# Patient Record
Sex: FEMALE | Race: WHITE | NOT HISPANIC OR LATINO | RURAL
[De-identification: names, ages, dates, MRNs, and addresses within clinical notes are randomized per-mention and may not be internally consistent; named-entity substitution may affect disease eponyms.]

---

## 2021-03-12 ENCOUNTER — PREPPED CHART (OUTPATIENT)
Dept: RURAL CLINIC 1 | Facility: CLINIC | Age: 61
End: 2021-03-12

## 2021-03-12 ENCOUNTER — IMPORTED ENCOUNTER (OUTPATIENT)
Dept: URBAN - NONMETROPOLITAN AREA CLINIC 1 | Facility: CLINIC | Age: 61
End: 2021-03-12

## 2021-03-12 PROBLEM — H52.223: Noted: 2021-03-12

## 2021-03-12 PROBLEM — E11.9: Noted: 2021-03-12

## 2021-03-12 PROBLEM — H52.13: Noted: 2021-03-12

## 2021-03-12 PROBLEM — H25.13: Noted: 2021-03-12

## 2021-03-12 PROCEDURE — 92004 COMPRE OPH EXAM NEW PT 1/>: CPT

## 2021-03-12 PROCEDURE — 92015 DETERMINE REFRACTIVE STATE: CPT

## 2021-03-12 NOTE — PATIENT DISCUSSION
DM s DR-Stressed the importance of keeping blood sugars under control blood pressure under control and weight normalization and regular visits with PCP. -Explained the possible effects of poorly controlled diabetes and the damage that diabetes can cause to ocular health. -Patient to check [id number].-Pt instructed to contact our office with any vision changes. Myopia-Discussed diagnosis with patient. -Explained that people who are myopic are at a higher risk for developing RD/RT and reviewed associated S&S.-Pt to contact our office if symptoms develop. Astigmatism-Discussed diagnosis with patient. Updated spec Rx given. Recommend lens that will provide comfort as well as protect safety and health of eyes. Cataract OU-Not yet surgical. -Reviewed symptoms of advancing cataract growth such as glare and halos and decreased vision.-Continue to monitor for now. Pt will notify us if any new symptoms develop.

## 2022-03-17 ENCOUNTER — ESTABLISHED PATIENT (OUTPATIENT)
Dept: RURAL CLINIC 1 | Facility: CLINIC | Age: 62
End: 2022-03-17

## 2022-03-17 DIAGNOSIS — H25.13: ICD-10-CM

## 2022-03-17 DIAGNOSIS — H52.223: ICD-10-CM

## 2022-03-17 DIAGNOSIS — H52.13: ICD-10-CM

## 2022-03-17 DIAGNOSIS — E11.9: ICD-10-CM

## 2022-03-17 PROCEDURE — 92014 COMPRE OPH EXAM EST PT 1/>: CPT

## 2022-03-17 PROCEDURE — 92015 DETERMINE REFRACTIVE STATE: CPT

## 2022-03-17 ASSESSMENT — TONOMETRY
OD_IOP_MMHG: 12
OS_IOP_MMHG: 12

## 2022-03-17 ASSESSMENT — VISUAL ACUITY
OD_CC: 20/25
OS_CC: 20/40

## 2022-04-10 ASSESSMENT — VISUAL ACUITY
OD_SC: 20/30
OU_CC: 20/40
OS_SC: 20/40

## 2022-04-10 ASSESSMENT — TONOMETRY
OS_IOP_MMHG: 16
OD_IOP_MMHG: 16